# Patient Record
Sex: FEMALE | Race: WHITE | NOT HISPANIC OR LATINO
[De-identification: names, ages, dates, MRNs, and addresses within clinical notes are randomized per-mention and may not be internally consistent; named-entity substitution may affect disease eponyms.]

---

## 2019-06-08 ENCOUNTER — TRANSCRIPTION ENCOUNTER (OUTPATIENT)
Age: 39
End: 2019-06-08

## 2019-07-15 ENCOUNTER — APPOINTMENT (OUTPATIENT)
Dept: OBGYN | Facility: CLINIC | Age: 39
End: 2019-07-15

## 2020-03-01 ENCOUNTER — TRANSCRIPTION ENCOUNTER (OUTPATIENT)
Age: 40
End: 2020-03-01

## 2021-07-07 ENCOUNTER — APPOINTMENT (OUTPATIENT)
Dept: DISASTER EMERGENCY | Facility: OTHER | Age: 41
End: 2021-07-07

## 2021-09-30 PROBLEM — Z00.00 ENCOUNTER FOR PREVENTIVE HEALTH EXAMINATION: Noted: 2021-09-30

## 2022-11-11 ENCOUNTER — NON-APPOINTMENT (OUTPATIENT)
Age: 42
End: 2022-11-11

## 2022-11-13 PROBLEM — Z00.00 ENCOUNTER FOR PREVENTIVE HEALTH EXAMINATION: Noted: 2022-11-13

## 2022-11-15 ENCOUNTER — APPOINTMENT (OUTPATIENT)
Dept: RHEUMATOLOGY | Facility: CLINIC | Age: 42
End: 2022-11-15

## 2022-11-15 VITALS
HEART RATE: 78 BPM | SYSTOLIC BLOOD PRESSURE: 126 MMHG | RESPIRATION RATE: 18 BRPM | WEIGHT: 125 LBS | OXYGEN SATURATION: 100 % | BODY MASS INDEX: 23.6 KG/M2 | DIASTOLIC BLOOD PRESSURE: 78 MMHG | HEIGHT: 61 IN

## 2022-11-15 DIAGNOSIS — L40.9 PSORIASIS, UNSPECIFIED: ICD-10-CM

## 2022-11-15 DIAGNOSIS — M25.529 PAIN IN UNSPECIFIED ELBOW: ICD-10-CM

## 2022-11-15 PROCEDURE — 99204 OFFICE O/P NEW MOD 45 MIN: CPT

## 2022-11-15 NOTE — ASSESSMENT
[FreeTextEntry1] : 43 y/o female referred to rheumatology for evaluation for autoimmune rheum diseases.\par Pt started to have enlarged lymph node of L neck with b/l elbow pains since 6 months ago.\par Pt's L neck lymph nodes have enlarged overnight and went to urgent care and advised to go see rheumatology to evaluate for autoimmune diseases. Labwork by urgent care shows low iron and reoccurrence of EBV.\par Pt's elbow/arm pains described as worse medially, sometimes with radiating, numbness.\par XR b/l elbows normal and MR C-spine negative reportedly showed "inflammation".\par Pt has also noticed much worsening of her psoriasis recently. Denies nail changes, uveitis, enthesitis, IBD.\par Denies other concerning symptoms.\par Labwork so far with negative CHUCKIE, CHUCKIE subserologies, RF, inflammatory markers. Pt with EBV+\par \par Patient has had enlarged left sided neck lymph node swelling with b/l elbow pains. Pt's elbow pains likely related to medial epicondylitis. Pt's joint pains are not inflammatory in nature. PsA is a consideration given pt's worsening psoriasis, but pt does not have clear inflammatory arthritis, dactylitis, nail changes, uveitis, IBD at this time for high suspicion.\par Pt's L sided lymph node swelling is not likely to be autoimmune. It may be related to EBV+, but given pt has had lymph node swelling for > 6 month, I do think pt needs further evaluation.\par \par - Obtain further labwork to evaluate for inflammatory arthritis\par - Will obtain XR b/l elbows from [facility] (obtained and added to H&P). Pt advised to send me MR C-spine results.\par - Refer to hematology for left cervical enlarged lymph nodes x 6 months\par - RTC 6 weeks for follow up\par \par

## 2022-11-15 NOTE — HISTORY OF PRESENT ILLNESS
[FreeTextEntry1] : 41 y/o female referred to rheumatology for evaluation for autoimmune rheum diseases.\par Pt started to have enlarged lymph node of L neck with b/l elbow pains since 6 months ago.\par Pt's L neck lymph nodes have enlarged overnight and went to urgent care and advised to go see rheumatology to evaluate for autoimmune diseases. Labwork by urgent care shows low iron and reoccurrence of EBV.\par Pt's elbow/arm pains described as worse medially, sometimes with radiating, numbness.\par XR b/l elbows normal and MR C-spine negative reportedly showed "inflammation".\par Pt has also noticed much worsening of her psoriasis recently. Denies nail changes, uveitis, enthesitis, IBD.\par Denies other concerning symptoms.\par \par WORKUP:\par Remarkable for (9/2022 - 10/2022): EBV (active infection), Hgb 11.5, low iron, Vit D 25-OH 26.6\par Normal/neg (9/2022 - 10/2022): CMP, CRP, ESR, U/A, CHUCKIE, dsDNA, SSA, SSB, Smith, RNP, RF, TSH, B12, Tick-borne diseases\par XR b/l elbows (9/2022): Normal

## 2022-11-16 ENCOUNTER — TRANSCRIPTION ENCOUNTER (OUTPATIENT)
Age: 42
End: 2022-11-16

## 2022-11-17 ENCOUNTER — APPOINTMENT (OUTPATIENT)
Dept: FAMILY MEDICINE | Facility: CLINIC | Age: 42
End: 2022-11-17

## 2022-11-17 ENCOUNTER — NON-APPOINTMENT (OUTPATIENT)
Age: 42
End: 2022-11-17

## 2022-11-17 ENCOUNTER — APPOINTMENT (OUTPATIENT)
Dept: ULTRASOUND IMAGING | Facility: CLINIC | Age: 42
End: 2022-11-17

## 2022-11-17 VITALS
OXYGEN SATURATION: 100 % | RESPIRATION RATE: 18 BRPM | HEART RATE: 80 BPM | HEIGHT: 61 IN | WEIGHT: 135 LBS | TEMPERATURE: 98 F | BODY MASS INDEX: 25.49 KG/M2 | DIASTOLIC BLOOD PRESSURE: 80 MMHG | SYSTOLIC BLOOD PRESSURE: 123 MMHG

## 2022-11-17 DIAGNOSIS — Z78.9 OTHER SPECIFIED HEALTH STATUS: ICD-10-CM

## 2022-11-17 DIAGNOSIS — Z80.0 FAMILY HISTORY OF MALIGNANT NEOPLASM OF DIGESTIVE ORGANS: ICD-10-CM

## 2022-11-17 DIAGNOSIS — Z76.89 PERSONS ENCOUNTERING HEALTH SERVICES IN OTHER SPECIFIED CIRCUMSTANCES: ICD-10-CM

## 2022-11-17 DIAGNOSIS — Z82.49 FAMILY HISTORY OF ISCHEMIC HEART DISEASE AND OTHER DISEASES OF THE CIRCULATORY SYSTEM: ICD-10-CM

## 2022-11-17 DIAGNOSIS — Z86.19 PERSONAL HISTORY OF OTHER INFECTIOUS AND PARASITIC DISEASES: ICD-10-CM

## 2022-11-17 DIAGNOSIS — Z87.891 PERSONAL HISTORY OF NICOTINE DEPENDENCE: ICD-10-CM

## 2022-11-17 DIAGNOSIS — Z87.2 PERSONAL HISTORY OF DISEASES OF THE SKIN AND SUBCUTANEOUS TISSUE: ICD-10-CM

## 2022-11-17 DIAGNOSIS — Z12.4 ENCOUNTER FOR SCREENING FOR MALIGNANT NEOPLASM OF CERVIX: ICD-10-CM

## 2022-11-17 PROCEDURE — 96160 PT-FOCUSED HLTH RISK ASSMT: CPT | Mod: 59

## 2022-11-17 PROCEDURE — G0444 DEPRESSION SCREEN ANNUAL: CPT | Mod: 59

## 2022-11-17 PROCEDURE — 99204 OFFICE O/P NEW MOD 45 MIN: CPT | Mod: 25

## 2022-11-17 PROCEDURE — 36415 COLL VENOUS BLD VENIPUNCTURE: CPT

## 2022-11-17 NOTE — HEALTH RISK ASSESSMENT
[Intercurrent Urgi Care visits] : went to urgent care [Former] : Former [Yes] : Yes [No] : In the past 12 months have you used drugs other than those required for medical reasons? No [PHQ-2 Negative - No further assessment needed] : PHQ-2 Negative - No further assessment needed [de-identified] : [facility]: 11/12/22 at [facility] [de-identified] : see hpi

## 2022-11-17 NOTE — PHYSICAL EXAM
[No Edema] : there was no peripheral edema [Grossly Normal Strength/Tone] : grossly normal strength/tone [Normal] : affect was normal and insight and judgment were intact [de-identified] : posterior-lateral left neck lymphadenopathy- non tender 1" circular and firm, nonmovable

## 2022-11-17 NOTE — PAST MEDICAL HISTORY
[Menstruating] : hx of menstruating [Regular Cycle Intervals] : have been regular [Living ___] : Living: [unfilled]

## 2022-11-17 NOTE — REVIEW OF SYSTEMS
[Joint Pain] : joint pain [Swollen Glands] : swollen glands [Negative] : Gastrointestinal [Skin Rash] : skin rash [Dysuria] : no dysuria [Hematuria] : no hematuria [Muscle Pain] : no muscle pain [Itching] : no itching [Headache] : no headache [de-identified] : enlarged lymph node

## 2022-11-17 NOTE — HISTORY OF PRESENT ILLNESS
[FreeTextEntry8] : Ms. ROBERT COOMBS is a 42 year old female who presents to the office to establish care.\par Medical hx reviewed. 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 \par C/o enlarged lymph node on left side for past 6 months with changes recently. Pt recently seen by Rheumatologist, advised to follow-up with hematology. \par \par HCM:\par LMP: 11/15/22, regular. \par PAP: 05/2022-[facility]\par MAMMO: 05/2022-[facility] ([facility]) \par \par Family hx reviewed- Father- diagnosed Liver CA

## 2022-11-18 ENCOUNTER — NON-APPOINTMENT (OUTPATIENT)
Age: 42
End: 2022-11-18

## 2022-11-20 LAB
ALBUMIN SERPL ELPH-MCNC: 4.7 G/DL
ALP BLD-CCNC: 59 U/L
ALT SERPL-CCNC: 13 U/L
ANION GAP SERPL CALC-SCNC: 12 MMOL/L
APPEARANCE: CLEAR
AST SERPL-CCNC: 15 U/L
BACTERIA: NEGATIVE
BASOPHILS # BLD AUTO: 0.04 K/UL
BASOPHILS NFR BLD AUTO: 0.7 %
BILIRUB SERPL-MCNC: 0.5 MG/DL
BILIRUBIN URINE: NEGATIVE
BLOOD URINE: NEGATIVE
BUN SERPL-MCNC: 13 MG/DL
CALCIUM SERPL-MCNC: 9.9 MG/DL
CHLORIDE SERPL-SCNC: 103 MMOL/L
CHOLEST SERPL-MCNC: 220 MG/DL
CO2 SERPL-SCNC: 25 MMOL/L
COLOR: NORMAL
CREAT SERPL-MCNC: 0.81 MG/DL
EBV EA AB SER IA-ACNC: 15.8 U/ML
EBV EA AB TITR SER IF: POSITIVE
EBV EA IGG SER QL IA: >600 U/ML
EBV EA IGG SER-ACNC: POSITIVE
EBV EA IGM SER IA-ACNC: POSITIVE
EBV PATRN SPEC IB-IMP: NORMAL
EBV VCA IGG SER IA-ACNC: >750 U/ML
EBV VCA IGM SER QL IA: 76.5 U/ML
EGFR: 92 ML/MIN/1.73M2
EOSINOPHIL # BLD AUTO: 0.14 K/UL
EOSINOPHIL NFR BLD AUTO: 2.5 %
EPSTEIN-BARR VIRUS CAPSID ANTIGEN IGG: POSITIVE
ESTIMATED AVERAGE GLUCOSE: 94 MG/DL
FERRITIN SERPL-MCNC: 15 NG/ML
FOLATE SERPL-MCNC: >20 NG/ML
GLUCOSE QUALITATIVE U: NEGATIVE
GLUCOSE SERPL-MCNC: 92 MG/DL
HBA1C MFR BLD HPLC: 4.9 %
HCT VFR BLD CALC: 38.9 %
HDLC SERPL-MCNC: 84 MG/DL
HGB BLD-MCNC: 12.3 G/DL
HYALINE CASTS: 2 /LPF
IMM GRANULOCYTES NFR BLD AUTO: 0.2 %
IRON SERPL-MCNC: 84 UG/DL
KETONES URINE: NEGATIVE
LDLC SERPL CALC-MCNC: 115 MG/DL
LEUKOCYTE ESTERASE URINE: NEGATIVE
LYMPHOCYTES # BLD AUTO: 1.6 K/UL
LYMPHOCYTES NFR BLD AUTO: 28.7 %
MAN DIFF?: NORMAL
MCHC RBC-ENTMCNC: 31.1 PG
MCHC RBC-ENTMCNC: 31.6 GM/DL
MCV RBC AUTO: 98.5 FL
MICROSCOPIC-UA: NORMAL
MONOCYTES # BLD AUTO: 0.35 K/UL
MONOCYTES NFR BLD AUTO: 6.3 %
NEUTROPHILS # BLD AUTO: 3.43 K/UL
NEUTROPHILS NFR BLD AUTO: 61.6 %
NITRITE URINE: NEGATIVE
NONHDLC SERPL-MCNC: 135 MG/DL
PH URINE: 6
PLATELET # BLD AUTO: 378 K/UL
POTASSIUM SERPL-SCNC: 4.8 MMOL/L
PROT SERPL-MCNC: 7.4 G/DL
PROTEIN URINE: NEGATIVE
RBC # BLD: 3.95 M/UL
RBC # FLD: 12.1 %
RED BLOOD CELLS URINE: 2 /HPF
SODIUM SERPL-SCNC: 140 MMOL/L
SPECIFIC GRAVITY URINE: 1.01
SQUAMOUS EPITHELIAL CELLS: 1 /HPF
T4 SERPL-MCNC: 9.1 UG/DL
TRIGL SERPL-MCNC: 104 MG/DL
TSH SERPL-ACNC: 1.43 UIU/ML
UROBILINOGEN URINE: NORMAL
VIT B12 SERPL-MCNC: 691 PG/ML
WBC # FLD AUTO: 5.57 K/UL
WHITE BLOOD CELLS URINE: 0 /HPF

## 2022-11-21 LAB
A PHAGOCYTOPH IGG TITR SER IF: NORMAL TITER
B BURGDOR AB SER QL IA: NEGATIVE
B MICROTI IGG TITR SER: NORMAL TITER
E CHAFFEENSIS IGG TITR SER IF: NORMAL TITER

## 2022-11-22 LAB
R RICKETTSI IGG CSF-ACNC: NEGATIVE
R RICKETTSI IGM CSF-ACNC: 2.44 INDEX

## 2022-11-23 ENCOUNTER — OUTPATIENT (OUTPATIENT)
Dept: OUTPATIENT SERVICES | Facility: HOSPITAL | Age: 42
LOS: 1 days | Discharge: HOME | End: 2022-11-23

## 2022-11-23 DIAGNOSIS — R59.0 LOCALIZED ENLARGED LYMPH NODES: ICD-10-CM

## 2022-11-28 ENCOUNTER — APPOINTMENT (OUTPATIENT)
Dept: HEMATOLOGY ONCOLOGY | Facility: CLINIC | Age: 42
End: 2022-11-28

## 2022-11-28 ENCOUNTER — RESULT REVIEW (OUTPATIENT)
Age: 42
End: 2022-11-28

## 2022-11-28 ENCOUNTER — NON-APPOINTMENT (OUTPATIENT)
Age: 42
End: 2022-11-28

## 2022-11-28 VITALS
WEIGHT: 137 LBS | HEART RATE: 101 BPM | DIASTOLIC BLOOD PRESSURE: 80 MMHG | TEMPERATURE: 98 F | HEIGHT: 61 IN | OXYGEN SATURATION: 98 % | BODY MASS INDEX: 25.86 KG/M2 | SYSTOLIC BLOOD PRESSURE: 113 MMHG

## 2022-11-28 DIAGNOSIS — R59.1 GENERALIZED ENLARGED LYMPH NODES: ICD-10-CM

## 2022-11-28 DIAGNOSIS — R59.0 LOCALIZED ENLARGED LYMPH NODES: ICD-10-CM

## 2022-11-28 LAB
BASOPHILS # BLD AUTO: 0.1 K/UL — SIGNIFICANT CHANGE UP (ref 0–0.2)
BASOPHILS NFR BLD AUTO: 1.1 % — SIGNIFICANT CHANGE UP (ref 0–2)
EOSINOPHIL # BLD AUTO: 0.1 K/UL — SIGNIFICANT CHANGE UP (ref 0–0.5)
EOSINOPHIL NFR BLD AUTO: 0.9 % — SIGNIFICANT CHANGE UP (ref 0–6)
HCT VFR BLD CALC: 37.1 % — SIGNIFICANT CHANGE UP (ref 34.5–45)
HGB BLD-MCNC: 12 G/DL — SIGNIFICANT CHANGE UP (ref 11.5–15.5)
LYMPHOCYTES # BLD AUTO: 2.4 K/UL — SIGNIFICANT CHANGE UP (ref 1–3.3)
LYMPHOCYTES # BLD AUTO: 27.1 % — SIGNIFICANT CHANGE UP (ref 13–44)
MCHC RBC-ENTMCNC: 31 PG — SIGNIFICANT CHANGE UP (ref 27–34)
MCHC RBC-ENTMCNC: 32.4 G/DL — SIGNIFICANT CHANGE UP (ref 32–36)
MCV RBC AUTO: 95.6 FL — SIGNIFICANT CHANGE UP (ref 80–100)
MONOCYTES # BLD AUTO: 0.5 K/UL — SIGNIFICANT CHANGE UP (ref 0–0.9)
MONOCYTES NFR BLD AUTO: 5.1 % — SIGNIFICANT CHANGE UP (ref 2–14)
NEUTROPHILS # BLD AUTO: 5.9 K/UL — SIGNIFICANT CHANGE UP (ref 1.8–7.4)
NEUTROPHILS NFR BLD AUTO: 65.8 % — SIGNIFICANT CHANGE UP (ref 43–77)
PLATELET # BLD AUTO: 351 K/UL — SIGNIFICANT CHANGE UP (ref 150–400)
RBC # BLD: 3.88 M/UL — SIGNIFICANT CHANGE UP (ref 3.8–5.2)
RBC # FLD: 11.4 % — SIGNIFICANT CHANGE UP (ref 10.3–14.5)
WBC # BLD: 9 K/UL — SIGNIFICANT CHANGE UP (ref 3.8–10.5)
WBC # FLD AUTO: 9 K/UL — SIGNIFICANT CHANGE UP (ref 3.8–10.5)

## 2022-11-28 PROCEDURE — 99205 OFFICE O/P NEW HI 60 MIN: CPT

## 2022-11-28 NOTE — ADDENDUM
[FreeTextEntry1] : Documented by Kayy Odonnell acting as scribe for Dr. Davidson on 11/28/2022 \par \par All Medical record entries made by the Scribe were at my, Dr. Davidson's, direction and personally dictated by me on 11/28/2022 . I have reviewed the chart and agree that the record accurately reflects my personal performance of the history, physical exam, assessment and plan. I have also personally directed, reviewed, and agreed with the discharge instructions.\par

## 2022-11-28 NOTE — ASSESSMENT
[FreeTextEntry1] : 42 year old F with an enlarged lymph node of L neck and b/l elbow pains since May 2022. Patient noticed lymph nodes increased in size and subsequently present to urgent care, lab work showed low iron and reoccurrence of EBV. XR b/l elbows was normal and MR C-spine negative reportedly showed "inflammation". Patient with a PMHX of  RMSF- 2-3 years ago- treated with antibiotic, psoriasis presents for initial evaluation of left cervical lymphadenopathy referred by Dr. Sj welsh \par \par \par Cervical Lymphadenopathy \par -11/17/22 USG Neck: Left cervical Level 5 showing multiple lymph nodes the largest of which demonstrate borderline morphology with cortical thickening measuring 1.5 x 1.8 x 1.1 cm.\par -Discussed with patient lymphadenopathy could be related to a lymphoma, infection, or inflammation. \par - IN addition EBV is linked to malignancy like HD, T cell lymphoma, Nasopharyngeal ca, \par -Denies B symptoms\par - she reports that enlarged LN for the past 6 months\par -Will send, CBC, CMP, B-12, Ferritin, Haptoglobin, LDH, Uric acid \par -Will recheck USG Neck in 5-6 weeks\par -Advised call if having any symptoms\par - Discussed with patient that definitive diagnosis is with biopsy \par \par Anemia\par -HGB on 11/15/22 at 11.7. Repeat HGB on 11/17/22 normal at 12.3\par -Will repeat CBC today\par \par -F/u Early January 2023 after repeat Usg neck \par

## 2022-11-28 NOTE — HISTORY OF PRESENT ILLNESS
[de-identified] : 42 year old F with an enlarged lymph node of L neck and b/l elbow pains since May 2022. Patient noticed lymph nodes increased in size and subsequently present to urgent care, lab work showed low iron and reoccurrence of EBV. XR b/l elbows was normal and MR C-spine negative reportedly showed "inflammation". Patient with a PMHX of  RMSF- 2-3 years ago- treated with antibiotic, psoriasis presents for initial evaluation of left cervical lymphadenopathy referred by Dr. Sj reese \par \par \par 11/15/22: WBC 6.6, HGB 11.7, HCT 35.4, MCV 94, , \par 11/17/22: EBV VCA IgG EIA >750.0 U/mL, EBV Capsid Antigen IgG Positive, [facility] Spotted Fever AB Negative, [facility] Spotted Fever AB IGM 2.44 index \par \par \par Scans:\par 9/12/22: Xray spine Mild dextroscoliosis in lumbar spine.\par XR b/l elbows (9/13/2022): Normal \par 11/17/22 USG Neck: Left cervical Level 5 showing multiple lymph nodes the largest of which demonstrate borderline morphology with cortical thickening measuring 1.5 x 1.8 x 1.1 cm.\par \par FMHx: Father Liver cancer, \par \par Rheum: Dr. Sj Reese \par PCP: Dr. Saloni Martinez \par  [de-identified] : Patient presents for an initial consultation\par \par Patient reports on 11/12/22 her cervical lymph node increased in size and she presented to urgent care.\par Patient reports daughter had coxsakie virus in 2018 and subsequently her EBV was elevated and she tested positive for RMSF.\par Again patient reports reoccurrence of RMSF on 11/22/22 labs and daughter with a recurrence of coxsakie\par Started doxycycline on 11/24/22 for RMSF\par Patient reports cervical lymphadenopathy decreased in size prior to starting antibiotics.\par Reports tiredness \par Tenderness and pain in elbows and neck\par Denies fevers, rashes, chills, \par Denies weight loss\par Reports night sweats 1x a week, admits she goes to sleep with a lot of clothes. Denies sweating through clothes.\par Normal appetite \par Reports blurry vision, occurs towards the end of the day. 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

## 2022-12-01 LAB
ALBUMIN SERPL ELPH-MCNC: 4.7 G/DL
ALP BLD-CCNC: 63 U/L
ALT SERPL-CCNC: 13 U/L
ANION GAP SERPL CALC-SCNC: 12 MMOL/L
AST SERPL-CCNC: 17 U/L
BILIRUB SERPL-MCNC: 0.3 MG/DL
BUN SERPL-MCNC: 11 MG/DL
CALCIUM SERPL-MCNC: 9.9 MG/DL
CHLORIDE SERPL-SCNC: 103 MMOL/L
CO2 SERPL-SCNC: 24 MMOL/L
CREAT SERPL-MCNC: 0.68 MG/DL
EGFR: 110 ML/MIN/1.73M2
FERRITIN SERPL-MCNC: 14 NG/ML
FOLATE SERPL-MCNC: 16.9 NG/ML
GLUCOSE SERPL-MCNC: 85 MG/DL
HAPTOGLOB SERPL-MCNC: 102 MG/DL
LDH SERPL-CCNC: 193 U/L
POTASSIUM SERPL-SCNC: 4.4 MMOL/L
PROT SERPL-MCNC: 7.3 G/DL
SODIUM SERPL-SCNC: 139 MMOL/L
URATE SERPL-MCNC: 3.4 MG/DL
VIT B12 SERPL-MCNC: 691 PG/ML

## 2022-12-27 ENCOUNTER — APPOINTMENT (OUTPATIENT)
Dept: RHEUMATOLOGY | Facility: CLINIC | Age: 42
End: 2022-12-27

## 2023-01-12 ENCOUNTER — APPOINTMENT (OUTPATIENT)
Dept: HEMATOLOGY ONCOLOGY | Facility: CLINIC | Age: 43
End: 2023-01-12

## 2024-01-12 ENCOUNTER — NON-APPOINTMENT (OUTPATIENT)
Age: 44
End: 2024-01-12

## 2024-01-12 ENCOUNTER — APPOINTMENT (OUTPATIENT)
Dept: FAMILY MEDICINE | Facility: CLINIC | Age: 44
End: 2024-01-12
Payer: COMMERCIAL

## 2024-01-12 VITALS
BODY MASS INDEX: 26.62 KG/M2 | OXYGEN SATURATION: 99 % | DIASTOLIC BLOOD PRESSURE: 79 MMHG | TEMPERATURE: 98 F | HEIGHT: 61 IN | SYSTOLIC BLOOD PRESSURE: 116 MMHG | HEART RATE: 74 BPM | WEIGHT: 141 LBS | RESPIRATION RATE: 18 BRPM

## 2024-01-12 DIAGNOSIS — Z13.29 ENCOUNTER FOR SCREENING FOR OTHER SUSPECTED ENDOCRINE DISORDER: ICD-10-CM

## 2024-01-12 DIAGNOSIS — Z12.39 ENCOUNTER FOR OTHER SCREENING FOR MALIGNANT NEOPLASM OF BREAST: ICD-10-CM

## 2024-01-12 DIAGNOSIS — Z13.31 ENCOUNTER FOR SCREENING FOR DEPRESSION: ICD-10-CM

## 2024-01-12 DIAGNOSIS — Z13.0 ENCOUNTER FOR SCREENING FOR DISEASES OF THE BLOOD AND BLOOD-FORMING ORGANS AND CERTAIN DISORDERS INVOLVING THE IMMUNE MECHANISM: ICD-10-CM

## 2024-01-12 DIAGNOSIS — Z13.220 ENCOUNTER FOR SCREENING FOR LIPOID DISORDERS: ICD-10-CM

## 2024-01-12 DIAGNOSIS — Z13.1 ENCOUNTER FOR SCREENING FOR DIABETES MELLITUS: ICD-10-CM

## 2024-01-12 DIAGNOSIS — B88.2 OTHER ARTHROPOD INFESTATIONS: ICD-10-CM

## 2024-01-12 DIAGNOSIS — Z12.11 ENCOUNTER FOR SCREENING FOR MALIGNANT NEOPLASM OF COLON: ICD-10-CM

## 2024-01-12 PROCEDURE — 93000 ELECTROCARDIOGRAM COMPLETE: CPT | Mod: 59

## 2024-01-12 PROCEDURE — 99396 PREV VISIT EST AGE 40-64: CPT | Mod: 25

## 2024-01-12 PROCEDURE — 96160 PT-FOCUSED HLTH RISK ASSMT: CPT | Mod: 59

## 2024-01-12 PROCEDURE — 36415 COLL VENOUS BLD VENIPUNCTURE: CPT

## 2024-01-12 PROCEDURE — G0444 DEPRESSION SCREEN ANNUAL: CPT | Mod: 59

## 2024-01-12 RX ORDER — DOXYCYCLINE HYCLATE 100 MG/1
100 CAPSULE ORAL TWICE DAILY
Qty: 28 | Refills: 0 | Status: DISCONTINUED | COMMUNITY
Start: 2022-11-23 | End: 2024-01-12

## 2024-01-12 NOTE — HEALTH RISK ASSESSMENT
[PHQ-2 Negative - No further assessment needed] : PHQ-2 Negative - No further assessment needed [Very Good] : ~his/her~  mood as very good [Yes] : Yes [2 - 4 times a month (2 pts)] : 2-4 times a month (2 points) [1 or 2 (0 pts)] : 1 or 2 (0 points) [Never (0 pts)] : Never (0 points) [No] : In the past 12 months have you used drugs other than those required for medical reasons? No [No falls in past year] : Patient reported no falls in the past year [Patient reported PAP Smear was normal] : Patient reported PAP Smear was normal [Patient reported bone density results were normal] : Patient reported bone density results were normal [None] : None [With Family] : lives with family [Employed] : employed [Significant Other] : lives with significant other [Sexually Active] : sexually active [Feels Safe at Home] : Feels safe at home [Fully functional (bathing, dressing, toileting, transferring, walking, feeding)] : Fully functional (bathing, dressing, toileting, transferring, walking, feeding) [Fully functional (using the telephone, shopping, preparing meals, housekeeping, doing laundry, using] : Fully functional and needs no help or supervision to perform IADLs (using the telephone, shopping, preparing meals, housekeeping, doing laundry, using transportation, managing medications and managing finances) [Reports changes in vision] : Reports changes in vision [Reports normal functional visual acuity (ie: able to read med bottle)] : Reports normal functional visual acuity [Smoke Detector] : smoke detector [Carbon Monoxide Detector] : carbon monoxide detector [Seat Belt] :  uses seat belt [Sunscreen] : uses sunscreen [Former] : Former [de-identified] : n/a [de-identified] : GYN  [de-identified] : tries to move around.  [de-identified] : mostly outside food  [Change in mental status noted] : No change in mental status noted [Reports changes in hearing] : Reports no changes in hearing [Reports changes in dental health] : Reports no changes in dental health [Travel to Developing Areas] : does not  travel to developing areas [PapSmearDate] : 2022 [BoneDensityDate] : 2022 [FreeTextEntry2] :

## 2024-01-12 NOTE — HISTORY OF PRESENT ILLNESS
[FreeTextEntry1] : CPE [de-identified] : Ms. ROBERT COOMBS is a 43 year old female who presents to the office for CPE

## 2024-01-12 NOTE — COUNSELING
[Fall prevention counseling provided] : Fall prevention counseling provided [Adequate lighting] : Adequate lighting [No throw rugs] : No throw rugs [Use proper foot wear] : Use proper foot wear [Behavioral health counseling provided] : Behavioral health counseling provided [Sleep ___ hours/day] : Sleep [unfilled] hours/day [Engage in a relaxing activity] : Engage in a relaxing activity [Plan in advance] : Plan in advance [Encouraged to maintain food diary] : Encouraged to maintain food diary [Encouraged to increase physical activity] : Encouraged to increase physical activity [____ min/wk Activity] : [unfilled] min/wk activity [Good understanding] : Patient has a good understanding of disease, goals and obesity follow-up plan

## 2024-01-12 NOTE — SIGNATURES
[TextEntry] : Saloni Crowe D.O.  Family Medicine [place]ician [place] [address] [phone]

## 2024-01-12 NOTE — REVIEW OF SYSTEMS
[Fatigue] : fatigue [Anxiety] : anxiety [Negative] : Gastrointestinal [Dysuria] : no dysuria [Hematuria] : no hematuria [Joint Pain] : no joint pain [Muscle Pain] : no muscle pain [Skin Rash] : no skin rash [Headache] : no headache [Insomnia] : no insomnia [Depression] : no depression [Swollen Glands] : no swollen glands

## 2024-01-12 NOTE — ASSESSMENT
[FreeTextEntry1] : Annual physical/wellness visit performed today Routine measurements including height, weight, BMI, and blood pressure performed. Medications reviewed and reconciled.  Tobacco, alcohol, and drug screening performed.  Annual depression screening performed.  Diet and exercise discussed.  Routine screening labs ordered.   In office EKG performed: NSR at 70bpm, NAD, normal intervals, no acute ST/TW changes noted. No prior available.   referral to GI provided for colonoscopy mammo and breast sono scripts provided for breast CA screening

## 2024-01-14 LAB
25(OH)D3 SERPL-MCNC: 29 NG/ML
ALBUMIN SERPL ELPH-MCNC: 4.7 G/DL
ALP BLD-CCNC: 64 U/L
ALT SERPL-CCNC: 12 U/L
ANION GAP SERPL CALC-SCNC: 13 MMOL/L
APPEARANCE: CLEAR
AST SERPL-CCNC: 13 U/L
BILIRUB SERPL-MCNC: 0.4 MG/DL
BILIRUBIN URINE: NEGATIVE
BLOOD URINE: NEGATIVE
BUN SERPL-MCNC: 18 MG/DL
CALCIUM SERPL-MCNC: 9.4 MG/DL
CHLORIDE SERPL-SCNC: 101 MMOL/L
CHOLEST SERPL-MCNC: 225 MG/DL
CO2 SERPL-SCNC: 24 MMOL/L
COLOR: YELLOW
CREAT SERPL-MCNC: 0.81 MG/DL
EGFR: 91 ML/MIN/1.73M2
ESTIMATED AVERAGE GLUCOSE: 88 MG/DL
FOLATE SERPL-MCNC: 6.7 NG/ML
GLUCOSE QUALITATIVE U: NEGATIVE MG/DL
GLUCOSE SERPL-MCNC: 87 MG/DL
HBA1C MFR BLD HPLC: 4.7 %
HCT VFR BLD CALC: 37.1 %
HDLC SERPL-MCNC: 101 MG/DL
HGB BLD-MCNC: 11.6 G/DL
IRON SERPL-MCNC: 81 UG/DL
KETONES URINE: NEGATIVE MG/DL
LDLC SERPL CALC-MCNC: 99 MG/DL
LEUKOCYTE ESTERASE URINE: NEGATIVE
MCHC RBC-ENTMCNC: 30.6 PG
MCHC RBC-ENTMCNC: 31.3 GM/DL
MCV RBC AUTO: 97.9 FL
NITRITE URINE: NEGATIVE
NONHDLC SERPL-MCNC: 124 MG/DL
PH URINE: 6
PLATELET # BLD AUTO: 360 K/UL
POTASSIUM SERPL-SCNC: 4 MMOL/L
PROT SERPL-MCNC: 7.2 G/DL
PROTEIN URINE: NEGATIVE MG/DL
RBC # BLD: 3.79 M/UL
RBC # FLD: 12.8 %
SODIUM SERPL-SCNC: 139 MMOL/L
SPECIFIC GRAVITY URINE: 1.01
T4 SERPL-MCNC: 8.8 UG/DL
TRIGL SERPL-MCNC: 151 MG/DL
TSH SERPL-ACNC: 1.43 UIU/ML
UROBILINOGEN URINE: 0.2 MG/DL
VIT B12 SERPL-MCNC: 714 PG/ML
WBC # FLD AUTO: 8.84 K/UL

## 2024-01-15 ENCOUNTER — APPOINTMENT (OUTPATIENT)
Dept: OBGYN | Facility: CLINIC | Age: 44
End: 2024-01-15
Payer: COMMERCIAL

## 2024-01-15 VITALS
SYSTOLIC BLOOD PRESSURE: 120 MMHG | BODY MASS INDEX: 27 KG/M2 | HEIGHT: 61 IN | DIASTOLIC BLOOD PRESSURE: 77 MMHG | WEIGHT: 143 LBS

## 2024-01-15 DIAGNOSIS — N95.1 MENOPAUSAL AND FEMALE CLIMACTERIC STATES: ICD-10-CM

## 2024-01-15 DIAGNOSIS — Z00.00 ENCOUNTER FOR GENERAL ADULT MEDICAL EXAMINATION W/OUT ABNORMAL FINDINGS: ICD-10-CM

## 2024-01-15 PROCEDURE — 99386 PREV VISIT NEW AGE 40-64: CPT

## 2024-01-15 PROCEDURE — 99213 OFFICE O/P EST LOW 20 MIN: CPT | Mod: 25

## 2024-01-15 NOTE — PLAN
[FreeTextEntry1] : check day 3 hormone levels.  call w results.  having men sx.  considering hrt, fu after bw.

## 2024-01-15 NOTE — HISTORY OF PRESENT ILLNESS
[FreeTextEntry1] : 44 yo p1 (cs- 7yo  daughter, pt is .)  pt here for annual exam. periods monthly but have been irreg, off by a week or so past few months, periods also heavier than they usually were.  woondering about perimenopause. wants hormonal testing.  mother dx w lissette body dementia ( 69 yo)  f  suddenly 6 yrs ago, has been isolated since.  m has good current menories, but was having hallucinations, broke collar bone, deposits noted on brain. remembers currrent event, gets hallucinations and intermittent vision problems, like can't see, or cant walk. inconsistent sx.  partner has vasectomy.  meds- none nkda denies btb, dc, pel pain.  pt is contracts mgr.  insomnia, night sweats. co brain fog.

## 2024-01-17 ENCOUNTER — TRANSCRIPTION ENCOUNTER (OUTPATIENT)
Age: 44
End: 2024-01-17

## 2024-01-17 LAB
A PHAGOCYTOPH IGG TITR SER IF: NORMAL
B BURGDOR AB SER QL IA: 0.57 IV
B MICROTI IGG TITR SER: NORMAL
E CHAFFEENSIS IGG TITR SER IF: NORMAL

## 2024-01-18 LAB — HPV HIGH+LOW RISK DNA PNL CVX: NOT DETECTED

## 2024-01-24 LAB
R RICKETTSI IGG CSF-ACNC: NORMAL
R RICKETTSI IGM CSF-ACNC: NORMAL

## 2024-01-25 LAB
CYTOLOGY CVX/VAG DOC THIN PREP: ABNORMAL
FSH SERPL-MCNC: 4.8 IU/L
TSH SERPL-ACNC: 1.71 UIU/ML

## 2024-01-28 DIAGNOSIS — N76.0 ACUTE VAGINITIS: ICD-10-CM

## 2024-01-28 DIAGNOSIS — B96.89 ACUTE VAGINITIS: ICD-10-CM

## 2024-01-28 LAB — ESTROGEN SERPL-MCNC: 638 PG/ML

## 2024-01-28 RX ORDER — METRONIDAZOLE 7.5 MG/G
0.75 GEL VAGINAL
Qty: 1 | Refills: 0 | Status: ACTIVE | COMMUNITY
Start: 2024-01-28 | End: 1900-01-01

## 2024-02-26 ENCOUNTER — APPOINTMENT (OUTPATIENT)
Dept: OBGYN | Facility: CLINIC | Age: 44
End: 2024-02-26